# Patient Record
Sex: MALE | Race: AMERICAN INDIAN OR ALASKA NATIVE
[De-identification: names, ages, dates, MRNs, and addresses within clinical notes are randomized per-mention and may not be internally consistent; named-entity substitution may affect disease eponyms.]

---

## 2018-09-19 ENCOUNTER — HOSPITAL ENCOUNTER (EMERGENCY)
Dept: HOSPITAL 42 - ED | Age: 28
Discharge: HOME | End: 2018-09-19
Payer: MEDICAID

## 2018-09-19 VITALS
DIASTOLIC BLOOD PRESSURE: 82 MMHG | HEART RATE: 82 BPM | TEMPERATURE: 98.2 F | OXYGEN SATURATION: 99 % | RESPIRATION RATE: 18 BRPM | SYSTOLIC BLOOD PRESSURE: 133 MMHG

## 2018-09-19 DIAGNOSIS — H61.22: Primary | ICD-10-CM

## 2018-09-19 NOTE — ED PDOC
Arrival/HPI





- General


Chief Complaint: ENT Problem


Time Seen by Provider: 09/19/18 12:53


Historian: Patient





- History of Present Illness


Narrative History of Present Illness (Text): 





09/19/18 13:48


27yr old male with clogged sensation in left ear x 2 day. Patient is 

complaining of slight pain and decreased hearing. He denies any trauma or 

injury. He denies fevers or chills. Patient denies using Q-tips. Patient denies 

sore throat or any other URI symptoms. No other complaints





Past Medical History





- Provider Review


Nursing Documentation Reviewed: Yes





- Travel History


Have you recently traveled outside [place] w/in the past 3 mons?: No





- Infectious Disease


Hx of Infectious Diseases: None





- Psychiatric


Hx Substance Use: No





- Anesthesia


Hx Anesthesia: No





Family/Social History





- Physician Review


Nursing Documentation Reviewed: Yes


Family/Social History: Unknown Family HX


Smoking Status: Unknown If Ever Smoked


Hx Alcohol Use: Yes


Frequency of alcohol use: Socially


Hx Substance Use: No





Allergies/Home Meds


Allergies/Adverse Reactions: 


Allergies





No Known Allergies Allergy (Verified 09/19/18 12:15)


 








Home Medications: 


 Home Meds











 Medication  Instructions  Recorded  Confirmed


 


No Known Home Med  09/19/18 09/19/18














Review of Systems





- Review of Systems


Constitutional: absent: Fatigue, Fevers


ENT: Hearing Changes, Other (left ear pain)


Respiratory: absent: SOB, Cough


Cardiovascular: absent: Chest Pain, Palpitations


Gastrointestinal: absent: Abdominal Pain, Nausea, Vomiting


Neurological: absent: Headache, Dizziness


Psychiatric: absent: Anxiety, Depression





Physical Exam


Vital Signs Reviewed: Yes


Vital Signs











  Temp Pulse Resp BP Pulse Ox


 


 09/19/18 12:12  98.2 F  82  18  133/82  99











Temperature: Afebrile


Blood Pressure: Normal


Pulse: Regular


Respiratory Rate: Normal


Appearance: Positive for: Well-Appearing, Non-Toxic, Comfortable


Pain Distress: None


Mental Status: Positive for: Alert and Oriented X 3





- Systems Exam


Head: Present: Atraumatic


Pupils: Present: PERRL


Extroacular Muscles: Present: EOMI


Ears: Present: Other (left TM obstructed by cerumen)


Mouth: Present: Moist Mucous Membranes


Pharnyx: Present: Normal


Neck: Present: Normal Range of Motion


Respiratory/Chest: Present: Clear to Auscultation


Cardiovascular: Present: Regular Rate and Rhythm


Neurological: Present: GCS=15


Skin: Present: Warm, Dry, Normal Color.  No: Rashes


Psychiatric: Present: Alert, Oriented x 3





Medical Decision Making


ED Course and Treatment: 





09/19/18 13:51


27-year-old male with left ear pain and decreased hearing for 2 days patient 

was found to have a left cerumen impaction








 procedure note: Left ear: Using an 18-gauge Angiocath and sterile water lavage 

was performed. Large amount of cerumen was removed. Hearing restored. TM 

without erythema. There is no perforation. No bulging. Patient tolerated 

procedure well. No complications








Patient reassessment: Patient denies pain. Denies decreased hearing. Alert 

oriented no distress. Stable vital signs








I advised follow up with primary care physician within the next 2 days. Advised 

follow-up with ENT specialist. Advised immediate return if symptoms worsen 

persist or if new concerning symptoms develop








Patient verbalizes understanding of discharge instructions and need for 

immediate followup.








all aspects of this case were discussed the attending of record. 








Impression: Cerumen impaction


Increase fluids


Follow-up the primary care physician within the next 2 days


Follow-up with the ENT specialist


Return if symptoms worsen persist or if new concerning symptoms develop








Reassessment Condition: Re-examined, Improved





Disposition/Present on Arrival





- Present on Arrival


Any Indicators Present on Arrival: No


History of DVT/PE: No


History of Uncontrolled Diabetes: No


Urinary Catheter: No


History of Decub. Ulcer: No


History Surgical Site Infection Following: None





- Disposition


Have Diagnosis and Disposition been Completed?: Yes


Diagnosis: 


 Cerumen impaction





Disposition: HOME/ ROUTINE


Disposition Time: 12:54


Patient Plan: Discharge


Condition: GOOD


Discharge Instructions (ExitCare):  Ear Wax Impaction (DC)


Additional Instructions: 


Increase fluids


Follow-up the primary care physician within the next 2 days


Follow-up with the ENT specialist


Return if symptoms worsen persist or if new concerning symptoms develop


Referrals: 


PCP,NO [Primary Care Provider] - Follow up with primary


Christy Almonte MD [Medical Doctor] - Follow up with primary


Crow Man DO [Staff Provider] - Follow up with primary


 Service [Outside] - Follow up with primary


Forms:  [facility] (English), WORK NOTE